# Patient Record
Sex: MALE | URBAN - METROPOLITAN AREA
[De-identification: names, ages, dates, MRNs, and addresses within clinical notes are randomized per-mention and may not be internally consistent; named-entity substitution may affect disease eponyms.]

---

## 2019-09-03 ENCOUNTER — OUTPATIENT (OUTPATIENT)
Dept: OUTPATIENT SERVICES | Facility: HOSPITAL | Age: 34
LOS: 1 days | End: 2019-09-03
Payer: COMMERCIAL

## 2019-09-03 ENCOUNTER — APPOINTMENT (OUTPATIENT)
Dept: UROLOGY | Facility: CLINIC | Age: 34
End: 2019-09-03
Payer: COMMERCIAL

## 2019-09-03 ENCOUNTER — APPOINTMENT (OUTPATIENT)
Dept: UROLOGY | Facility: CLINIC | Age: 34
End: 2019-09-03

## 2019-09-03 VITALS
HEART RATE: 96 BPM | DIASTOLIC BLOOD PRESSURE: 68 MMHG | SYSTOLIC BLOOD PRESSURE: 102 MMHG | HEIGHT: 68 IN | WEIGHT: 180 LBS | BODY MASS INDEX: 27.28 KG/M2 | RESPIRATION RATE: 18 BRPM

## 2019-09-03 PROBLEM — Z00.00 ENCOUNTER FOR PREVENTIVE HEALTH EXAMINATION: Status: ACTIVE | Noted: 2019-09-03

## 2019-09-03 PROCEDURE — 93975 VASCULAR STUDY: CPT | Mod: 26

## 2019-09-03 PROCEDURE — 76870 US EXAM SCROTUM: CPT | Mod: 26

## 2019-09-03 PROCEDURE — 76870 US EXAM SCROTUM: CPT

## 2019-09-03 PROCEDURE — 99204 OFFICE O/P NEW MOD 45 MIN: CPT

## 2019-09-03 PROCEDURE — 93975 VASCULAR STUDY: CPT

## 2019-09-03 NOTE — PHYSICAL EXAM
[General Appearance - Well Developed] : well developed [General Appearance - Well Nourished] : well nourished [Normal Appearance] : normal appearance [Well Groomed] : well groomed [General Appearance - In No Acute Distress] : no acute distress [Heart Rate And Rhythm] : Heart rate and rhythm were normal [Arterial Pulses Normal] : the pedal pulses were normal [Edema] : no peripheral edema [Respiration, Rhythm And Depth] : normal respiratory rhythm and effort [Exaggerated Use Of Accessory Muscles For Inspiration] : no accessory muscle use [Auscultation Breath Sounds / Voice Sounds] : lungs were clear to auscultation bilaterally [Chest Palpation] : palpation of the chest revealed no abnormalities [Lungs Percussion] : the lungs were normal to percussion [Bowel Sounds] : normal bowel sounds [Abdomen Soft] : soft [Abdomen Tenderness] : non-tender [Abdomen Mass (___ Cm)] : no abdominal mass palpated [Abdomen Hernia] : no hernia was discovered [Costovertebral Angle Tenderness] : no ~M costovertebral angle tenderness [Urethral Meatus] : meatus normal [Urinary Bladder Findings] : the bladder was normal on palpation [Scrotum] : the scrotum was normal [Epididymis] : the epididymides were normal [Testes Tenderness] : no tenderness of the testes [Testes Mass (___cm)] : there were no testicular masses [No Prostate Nodules] : no prostate nodules [Anus Abnormality] : the anus and perineum were normal [Normal Station and Gait] : the gait and station were normal for the patient's age [Skin Color & Pigmentation] : normal skin color and pigmentation [Skin Turgor] : supple [] : no rash [Skin Lesions] : no skin lesions [No Focal Deficits] : no focal deficits [Sensation] : the sensory exam was normal to light touch and pinprick [Motor Exam] : the motor exam was normal [Oriented To Time, Place, And Person] : oriented to person, place, and time [Affect] : the affect was normal [Mood] : the mood was normal [Not Anxious] : not anxious [No Palpable Adenopathy] : no palpable adenopathy [Cervical Lymph Nodes Enlarged Posterior Bilaterally] : posterior cervical [Cervical Lymph Nodes Enlarged Anterior Bilaterally] : anterior cervical [Supraclavicular Lymph Nodes Enlarged Bilaterally] : supraclavicular [Axillary Lymph Nodes Enlarged Bilaterally] : axillary [Femoral Lymph Nodes Enlarged Bilaterally] : femoral [Inguinal Lymph Nodes Enlarged Bilaterally] : inguinal [FreeTextEntry1] : bilateral varicoceles, larger on the left [Penis Abnormality] : normal circumcised penis

## 2019-09-03 NOTE — ASSESSMENT
[FreeTextEntry1] : This pleasant  gentleman presents with secondary subfertilty and bilateral varicoceles recently off injectable testosterone.  I have requested several baseline blood studies and additional imaging.  Urine dip and microscopic analysis was requested.  I will make more specific recommendations after the results of the requested tests return.\par 1. Review blood studies and semen analysis on follow up\par 2. Discuss options for therapy on follow up\par

## 2019-09-03 NOTE — LETTER BODY
[FreeTextEntry1] : Dear   ,\par \par Thank you for referring your patient Ammon Arambula for consultation for secondary subfertility. I have requeste several baseline blood studies and a semen analysis. I will see the patient back in followup shortly and make further recommendations. I have attached a copy of my consultation note for your records.\par \par Thank you again for this kind referral. I will certainly keep you updated with further progress. Please do not hesitate to call me if you have any questions.\par \par Best regards,\par \par \par \par Vitor Sorensen M.D., PhD\par Professor of Urology\par    [facility] [employer] [facility]\par Director, Reproductive and Sexual Medicine\par    [facility]\par    [facility]\par

## 2019-09-03 NOTE — HISTORY OF PRESENT ILLNESS
[FreeTextEntry1] : Patient is a 33-year-old gentleman who presents with his wife and they're a 9-month-old daughter with the chief complaint of secondary subfertility for evaluation. I reviewed the questionnaire he had completed with him in detail. he is presently rate 50 mg a day. The patient had been on injectable testosterone together with hCG since September of 2016 to January of 2018. He went off it and conceived a daughter with his wife who was born in November of 2018 he had gone back on testosterone injections in April of 2018 and stopped one month ago. He had been taking 200 mg of testosterone or weak. They would like to have a second child. He has no known drug allergies.  His past medical history demonstrates no significant urologic issues.  In his present occupation as an  he has no known toxin exposure.  He does smoke and drinks only socially.  He has no known drug allergies.  His review of systems is non-contributory. His family history is not significant.\par 
home

## 2019-09-04 DIAGNOSIS — R35.0 FREQUENCY OF MICTURITION: ICD-10-CM

## 2019-09-04 LAB
25(OH)D3 SERPL-MCNC: 32.2 NG/ML
ALBUMIN SERPL ELPH-MCNC: 5 G/DL
ALP BLD-CCNC: 77 U/L
ALT SERPL-CCNC: 34 U/L
ANION GAP SERPL CALC-SCNC: 18 MMOL/L
AST SERPL-CCNC: 31 U/L
BASOPHILS # BLD AUTO: 0.07 K/UL
BASOPHILS NFR BLD AUTO: 1.1 %
BILIRUB SERPL-MCNC: 0.2 MG/DL
BUN SERPL-MCNC: 22 MG/DL
CALCIUM SERPL-MCNC: 9.5 MG/DL
CHLORIDE SERPL-SCNC: 100 MMOL/L
CHOLEST SERPL-MCNC: 147 MG/DL
CHOLEST/HDLC SERPL: 2.9 RATIO
CO2 SERPL-SCNC: 22 MMOL/L
CREAT SERPL-MCNC: 1.22 MG/DL
EOSINOPHIL # BLD AUTO: 0.42 K/UL
EOSINOPHIL NFR BLD AUTO: 6.8 %
ESTIMATED AVERAGE GLUCOSE: 108 MG/DL
GLUCOSE SERPL-MCNC: 76 MG/DL
HBA1C MFR BLD HPLC: 5.4 %
HCT VFR BLD CALC: 49.4 %
HDLC SERPL-MCNC: 51 MG/DL
HGB BLD-MCNC: 15.9 G/DL
IMM GRANULOCYTES NFR BLD AUTO: 0.3 %
LDLC SERPL CALC-MCNC: 72 MG/DL
LYMPHOCYTES # BLD AUTO: 1.92 K/UL
LYMPHOCYTES NFR BLD AUTO: 31.3 %
MAN DIFF?: NORMAL
MCHC RBC-ENTMCNC: 27.6 PG
MCHC RBC-ENTMCNC: 32.2 GM/DL
MCV RBC AUTO: 85.8 FL
MONOCYTES # BLD AUTO: 0.36 K/UL
MONOCYTES NFR BLD AUTO: 5.9 %
NEUTROPHILS # BLD AUTO: 3.35 K/UL
NEUTROPHILS NFR BLD AUTO: 54.6 %
PLATELET # BLD AUTO: 223 K/UL
POTASSIUM SERPL-SCNC: 4.4 MMOL/L
PROT SERPL-MCNC: 7.3 G/DL
RBC # BLD: 5.76 M/UL
RBC # FLD: 14.4 %
SODIUM SERPL-SCNC: 140 MMOL/L
TRIGL SERPL-MCNC: 120 MG/DL
WBC # FLD AUTO: 6.14 K/UL

## 2019-09-06 DIAGNOSIS — N46.9 MALE INFERTILITY, UNSPECIFIED: ICD-10-CM

## 2019-09-06 DIAGNOSIS — E34.9 ENDOCRINE DISORDER, UNSPECIFIED: ICD-10-CM

## 2019-09-09 LAB
ESTRADIOL SERPL-MCNC: <5 PG/ML
FSH SERPL-MCNC: 2.5 IU/L
LH SERPL-ACNC: 2.3 IU/L
PROLACTIN SERPL-MCNC: 7.7 NG/ML
TESTOST BND SERPL-MCNC: 6.7 PG/ML
TESTOST SERPL-MCNC: 225.8 NG/DL
TSH SERPL-ACNC: 2.19 UIU/ML

## 2019-10-10 ENCOUNTER — APPOINTMENT (OUTPATIENT)
Dept: UROLOGY | Facility: CLINIC | Age: 34
End: 2019-10-10
Payer: COMMERCIAL

## 2019-10-10 DIAGNOSIS — E34.9 ENDOCRINE DISORDER, UNSPECIFIED: ICD-10-CM

## 2019-10-10 DIAGNOSIS — N46.9 MALE INFERTILITY, UNSPECIFIED: ICD-10-CM

## 2019-10-10 LAB
BASOPHILS # BLD AUTO: 0.07 K/UL
BASOPHILS NFR BLD AUTO: 1.1 %
EOSINOPHIL # BLD AUTO: 0.65 K/UL
EOSINOPHIL NFR BLD AUTO: 9.9 %
HCT VFR BLD CALC: 49.4 %
HGB BLD-MCNC: 16 G/DL
IMM GRANULOCYTES NFR BLD AUTO: 1.1 %
LYMPHOCYTES # BLD AUTO: 1.99 K/UL
LYMPHOCYTES NFR BLD AUTO: 30.2 %
MAN DIFF?: NORMAL
MCHC RBC-ENTMCNC: 28.7 PG
MCHC RBC-ENTMCNC: 32.4 GM/DL
MCV RBC AUTO: 88.5 FL
MONOCYTES # BLD AUTO: 0.52 K/UL
MONOCYTES NFR BLD AUTO: 7.9 %
NEUTROPHILS # BLD AUTO: 3.28 K/UL
NEUTROPHILS NFR BLD AUTO: 49.8 %
PLATELET # BLD AUTO: 195 K/UL
RBC # BLD: 5.58 M/UL
RBC # FLD: 14.6 %
WBC # FLD AUTO: 6.58 K/UL

## 2019-10-10 PROCEDURE — 99214 OFFICE O/P EST MOD 30 MIN: CPT

## 2019-10-10 RX ORDER — CLOMIPHENE CITRATE 50 MG/1
50 TABLET ORAL
Qty: 45 | Refills: 1 | Status: ACTIVE | COMMUNITY
Start: 2019-10-10 | End: 1900-01-01

## 2019-10-10 NOTE — ASSESSMENT
[FreeTextEntry1] : The patient returns for followup to review his recent blood studies, semen analysis and to discuss options for therapy. Blood studies demonstrated a low normal FSH, LH and a low testosterone and estradiol. He has been taking 50 mg of clomiphene citrate a day for the past month. Recent semen analysis from [facility] demonstrated a volume of 0.7 and a concentration of 85 million per he had noted his wife had missed her period for several weeks before it restarted. Discussed that his sperm count is in the range that fertility might be possible. We will repeat blood studies today and see him back in 2 months for a semen analysis and follow up.

## 2019-10-10 NOTE — HISTORY OF PRESENT ILLNESS
[FreeTextEntry1] : The patient returns for followup to review his recent blood studies, semen analysis and to discuss options for therapy per\par \par PMH: Patient presented with his wife and they're a 9-month-old daughter with the chief complaint of secondary subfertility for evaluation. he is presently rate 50 mg a day. The patient had been on injectable testosterone together with hCG since September of 2016 to January of 2018. He went off it and conceived a daughter with his wife who was born in November of 2018 he had gone back on testosterone injections in April of 2018 and stopped one month ago. He had been taking 200 mg of testosterone or weak. They would like to have a second child. He has no known drug allergies.  His past medical history demonstrates no significant urologic issues.  In his present occupation as an  he has no known toxin exposure.  He does smoke and drinks only socially.  He has no known drug allergies.  His review of systems is non-contributory. His family history is not significant.\par

## 2019-10-10 NOTE — PHYSICAL EXAM
[General Appearance - Well Developed] : well developed [General Appearance - Well Nourished] : well nourished [Well Groomed] : well groomed [Normal Appearance] : normal appearance [General Appearance - In No Acute Distress] : no acute distress [Abdomen Soft] : soft [Urinary Bladder Findings] : the bladder was normal on palpation [Urethral Meatus] : meatus normal [Costovertebral Angle Tenderness] : no ~M costovertebral angle tenderness [Scrotum] : the scrotum was normal [Testes Mass (___cm)] : there were no testicular masses [Skin Turgor] : supple [] : no rash [Oriented To Time, Place, And Person] : oriented to person, place, and time [Affect] : the affect was normal [Mood] : the mood was normal [Not Anxious] : not anxious [Normal Station and Gait] : the gait and station were normal for the patient's age

## 2019-10-15 LAB
ESTRADIOL SERPL-MCNC: 25 PG/ML
FSH SERPL-MCNC: 7.7 IU/L
LH SERPL-ACNC: 17.7 IU/L
PROLACTIN SERPL-MCNC: 6.1 NG/ML

## 2019-10-18 LAB
TESTOST BND SERPL-MCNC: 11.6 PG/ML
TESTOST SERPL-MCNC: 419.4 NG/DL

## 2019-11-14 ENCOUNTER — APPOINTMENT (OUTPATIENT)
Dept: UROLOGY | Facility: CLINIC | Age: 34
End: 2019-11-14